# Patient Record
Sex: FEMALE | Race: WHITE | NOT HISPANIC OR LATINO | Employment: UNEMPLOYED | URBAN - METROPOLITAN AREA
[De-identification: names, ages, dates, MRNs, and addresses within clinical notes are randomized per-mention and may not be internally consistent; named-entity substitution may affect disease eponyms.]

---
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
## 2024-01-08 ENCOUNTER — OFFICE VISIT (OUTPATIENT)
Dept: RHEUMATOLOGY | Facility: CLINIC | Age: 19
End: 2024-01-08
Attending: PEDIATRICS
Payer: COMMERCIAL

## 2024-01-08 VITALS
DIASTOLIC BLOOD PRESSURE: 76 MMHG | WEIGHT: 159.61 LBS | HEART RATE: 69 BPM | OXYGEN SATURATION: 99 % | BODY MASS INDEX: 25.65 KG/M2 | SYSTOLIC BLOOD PRESSURE: 113 MMHG | TEMPERATURE: 98.5 F | HEIGHT: 66 IN

## 2024-01-08 DIAGNOSIS — M13.0 POLYARTHRITIS: ICD-10-CM

## 2024-01-08 DIAGNOSIS — I73.00 RAYNAUD'S DISEASE WITHOUT GANGRENE: ICD-10-CM

## 2024-01-08 DIAGNOSIS — M35.1 MCTD (MIXED CONNECTIVE TISSUE DISEASE) (H): ICD-10-CM

## 2024-01-08 DIAGNOSIS — M08.80 POLYARTICULAR JUVENILE IDIOPATHIC ARTHRITIS (H): ICD-10-CM

## 2024-01-08 DIAGNOSIS — D84.9 IMMUNOSUPPRESSED STATUS (H): ICD-10-CM

## 2024-01-08 DIAGNOSIS — Z79.899 LONG-TERM USE OF PLAQUENIL: ICD-10-CM

## 2024-01-08 LAB
ALBUMIN SERPL BCG-MCNC: 3.9 G/DL (ref 3.5–5.2)
ALP SERPL-CCNC: 84 U/L (ref 40–150)
ALT SERPL W P-5'-P-CCNC: 16 U/L (ref 0–50)
ANION GAP SERPL CALCULATED.3IONS-SCNC: 8 MMOL/L (ref 7–15)
AST SERPL W P-5'-P-CCNC: 25 U/L (ref 0–35)
BASOPHILS # BLD AUTO: 0 10E3/UL (ref 0–0.2)
BASOPHILS NFR BLD AUTO: 0 %
BILIRUB SERPL-MCNC: 0.2 MG/DL
BUN SERPL-MCNC: 10.6 MG/DL (ref 6–20)
CALCIUM SERPL-MCNC: 9.1 MG/DL (ref 8.6–10)
CHLORIDE SERPL-SCNC: 103 MMOL/L (ref 98–107)
CREAT SERPL-MCNC: 0.71 MG/DL (ref 0.51–0.95)
CRP SERPL-MCNC: <3 MG/L
DEPRECATED HCO3 PLAS-SCNC: 23 MMOL/L (ref 22–29)
EGFRCR SERPLBLD CKD-EPI 2021: >90 ML/MIN/1.73M2
EOSINOPHIL # BLD AUTO: 0.1 10E3/UL (ref 0–0.7)
EOSINOPHIL NFR BLD AUTO: 1 %
ERYTHROCYTE [DISTWIDTH] IN BLOOD BY AUTOMATED COUNT: 12.7 % (ref 10–15)
ERYTHROCYTE [SEDIMENTATION RATE] IN BLOOD BY WESTERGREN METHOD: 8 MM/HR (ref 0–20)
GLUCOSE SERPL-MCNC: 88 MG/DL (ref 70–99)
HCT VFR BLD AUTO: 41.4 % (ref 35–47)
HGB BLD-MCNC: 14.1 G/DL (ref 11.7–15.7)
IMM GRANULOCYTES # BLD: 0 10E3/UL
IMM GRANULOCYTES NFR BLD: 0 %
LYMPHOCYTES # BLD AUTO: 1.3 10E3/UL (ref 0.8–5.3)
LYMPHOCYTES NFR BLD AUTO: 26 %
MCH RBC QN AUTO: 29.6 PG (ref 26.5–33)
MCHC RBC AUTO-ENTMCNC: 34.1 G/DL (ref 31.5–36.5)
MCV RBC AUTO: 87 FL (ref 78–100)
MONOCYTES # BLD AUTO: 0.5 10E3/UL (ref 0–1.3)
MONOCYTES NFR BLD AUTO: 9 %
NEUTROPHILS # BLD AUTO: 3.2 10E3/UL (ref 1.6–8.3)
NEUTROPHILS NFR BLD AUTO: 64 %
NRBC # BLD AUTO: 0 10E3/UL
NRBC BLD AUTO-RTO: 0 /100
PLATELET # BLD AUTO: 225 10E3/UL (ref 150–450)
POTASSIUM SERPL-SCNC: 4.2 MMOL/L (ref 3.4–5.3)
PROT SERPL-MCNC: 7.1 G/DL (ref 6.4–8.3)
RBC # BLD AUTO: 4.77 10E6/UL (ref 3.8–5.2)
SODIUM SERPL-SCNC: 134 MMOL/L (ref 135–145)
WBC # BLD AUTO: 5.1 10E3/UL (ref 4–11)

## 2024-01-08 PROCEDURE — 86225 DNA ANTIBODY NATIVE: CPT | Performed by: PEDIATRICS

## 2024-01-08 PROCEDURE — 99213 OFFICE O/P EST LOW 20 MIN: CPT | Mod: 25 | Performed by: PEDIATRICS

## 2024-01-08 PROCEDURE — 86160 COMPLEMENT ANTIGEN: CPT | Performed by: PEDIATRICS

## 2024-01-08 PROCEDURE — 86235 NUCLEAR ANTIGEN ANTIBODY: CPT | Performed by: PEDIATRICS

## 2024-01-08 PROCEDURE — 86140 C-REACTIVE PROTEIN: CPT | Performed by: PEDIATRICS

## 2024-01-08 PROCEDURE — 90686 IIV4 VACC NO PRSV 0.5 ML IM: CPT

## 2024-01-08 PROCEDURE — 85025 COMPLETE CBC W/AUTO DIFF WBC: CPT | Performed by: PEDIATRICS

## 2024-01-08 PROCEDURE — G0008 ADMIN INFLUENZA VIRUS VAC: HCPCS

## 2024-01-08 PROCEDURE — 85652 RBC SED RATE AUTOMATED: CPT | Performed by: PEDIATRICS

## 2024-01-08 PROCEDURE — 99215 OFFICE O/P EST HI 40 MIN: CPT | Performed by: PEDIATRICS

## 2024-01-08 PROCEDURE — 86147 CARDIOLIPIN ANTIBODY EA IG: CPT | Performed by: PEDIATRICS

## 2024-01-08 PROCEDURE — 80053 COMPREHEN METABOLIC PANEL: CPT | Performed by: PEDIATRICS

## 2024-01-08 PROCEDURE — 36415 COLL VENOUS BLD VENIPUNCTURE: CPT | Performed by: PEDIATRICS

## 2024-01-08 PROCEDURE — 250N000011 HC RX IP 250 OP 636

## 2024-01-08 RX ORDER — NIFEDIPINE 30 MG/1
30 TABLET, EXTENDED RELEASE ORAL DAILY
Qty: 30 TABLET | Refills: 5 | Status: SHIPPED | OUTPATIENT
Start: 2024-01-08 | End: 2024-01-08

## 2024-01-08 RX ORDER — HYDROXYCHLOROQUINE SULFATE 200 MG/1
TABLET, FILM COATED ORAL
Qty: 135 TABLET | Refills: 3 | Status: SHIPPED | OUTPATIENT
Start: 2024-01-08 | End: 2024-04-12

## 2024-01-08 RX ORDER — ETANERCEPT 50 MG/ML
SOLUTION SUBCUTANEOUS
Qty: 4 ML | Refills: 5 | Status: SHIPPED | OUTPATIENT
Start: 2024-01-08 | End: 2024-06-12

## 2024-01-08 RX ORDER — HYDROXYCHLOROQUINE SULFATE 200 MG/1
TABLET, FILM COATED ORAL
Qty: 45 TABLET | Refills: 11 | Status: SHIPPED | OUTPATIENT
Start: 2024-01-08 | End: 2024-01-08

## 2024-01-08 RX ORDER — NIFEDIPINE 30 MG/1
30 TABLET, EXTENDED RELEASE ORAL DAILY
Qty: 90 TABLET | Refills: 1 | Status: SHIPPED | OUTPATIENT
Start: 2024-01-08 | End: 2024-04-12

## 2024-01-08 ASSESSMENT — PAIN SCALES - GENERAL: PAINLEVEL: NO PAIN (0)

## 2024-01-08 NOTE — PATIENT INSTRUCTIONS
From MCTD standpoint symptomatically doing well.  Refilled medications.  Gave eye referral to get eye exam done within next several months to screen for Plaquenil toxicity.    Plan:  Labs today and every 6 months.  Continue Enbrel, Plaquenil and nifedipine.  Need yearly eye exams screening for Plaquenil side effects, I put in a referral.  Call to make an appointment and keep it.  Flu shot today.  FOllow up late May/Early June 2024.  Call/MyChart sooner with questions or concerns.  We'll keep readdressing transition to adult rheumatology.    For Patient Education Materials:  z.[email or web address]/fo       [facility] Pediatric Rheumatology    For Help:  The Pediatric Call Center at [phone] can help with scheduling of routine follow up visits.  Letty Aviles and Donya Schwartz are the Nurse Coordinators for the Division of Pediatric Rheumatology and can be reached by phone at [phone] or through [employer] [email or web address]). They can help with questions about your child s rheumatic condition, medications, and test results.  For emergencies after hours or on the weekends, please call the page  at [phone] and ask to speak to the physician on-call for Pediatric Rheumatology. Please do not use [employer] for urgent requests.  Main  Services:  [phone]  Hmong/Azeri/Montserratian: [phone]  Wallisian: [phone]  Czech: [phone]    Internal Referrals: If we refer your child to another physician/team within [facility], you should receive a call to set this up. If you do not hear anything within a week, please call the Call Center at [phone].    External Referrals: If we refer your child to a physician/team outside of [facility], our team will send the referral order and relevant records to them. We ask that you call the place where your child is being referred to ensure they received the needed information and notify our team coordinators if  not.    Imaging: If your child needs an imaging study that is not being performed the day of your clinic appointment, please call to set this up. For xrays, ultrasounds, and echocardiogram call [phone]. For CT or MRI call [phone].     MyChart: We encourage you to sign up for MyChart at [email or web address]. For assistance or questions, call [phone]. If your child is 12 years or older, a consent for proxy/parent access needs to be signed so please discuss this with your physician at the next visit.

## 2024-01-08 NOTE — LETTER
1/8/2024      RE: Darshaan Romero  [address] [facility] [address]     Dear Colleague,    Thank you for the opportunity to participate in the care of your patient, Darshana Romero, at the [facility] at [facility]. Please see a copy of my visit note below.    No notes on file    Please do not hesitate to contact me if you have any questions/concerns.     Sincerely,       Ofelia Slade MD

## 2024-01-08 NOTE — NURSING NOTE
"FLU VACCINE QUESTIONNAIRE:  Ask the following questions of all parties who want influenza vaccination:     CONTRAINDICATIONS  1.  Is the patient age less than 6 months?  NO  2.  Has the person to be vaccinated ever had Guillain-Gibson syndrome? NO  3.  Has the person to be vaccinated had the vaccine this year? NO  4.  Is the person to be vaccinated sick today? NO  5.  Does the person to be vaccinated have an allergy to eggs or a component of the vaccine? NO  6.  Has the person to vaccinated ever had a serious reaction to an influenza vaccination in the past? NO    If the answer to ALL of the above questions is \"No\", then please administer the influenza vaccine per the standard protocol.  If the patient answered \"Yes\" to questions 1 or 2, do not administer the vaccine. If the patient answered \"Yes\" to question 3, do not administer the vaccine unless the patient is a child receiving the vaccine in two doses. If the patient answered \"Yes\" to questions 4, 5, and/or 6, get additional details on sickness and/or reaction and refer to provider. If you have any questions regarding contraindications, please refer to the provider.                                                         INFLUENZA VACCINATION NOTE      Information sheet given to patient and questions answered.     Patient or representative refused vaccination.   Reason:     ORDERS: Give influenza Vaccine   Ordered by RUCHI Swift on January 8, 2024    [ Do not give Influenza Vaccine due to contraindication or refusal ]    Candidate for Pneumovax? No    INDICATION FOR VACCINATION:  Anyone from 6 months of age or older.        Joyce Resendiz M.A.  "

## 2024-01-09 LAB
C3 SERPL-MCNC: 87 MG/DL (ref 81–157)
C4 SERPL-MCNC: 18 MG/DL (ref 13–39)

## 2024-01-10 LAB
CARDIOLIPIN IGG SER IA-ACNC: <2 GPL-U/ML
CARDIOLIPIN IGG SER IA-ACNC: NEGATIVE
CARDIOLIPIN IGM SER IA-ACNC: 7 MPL-U/ML
CARDIOLIPIN IGM SER IA-ACNC: NEGATIVE
DSDNA AB SER-ACNC: 1.2 IU/ML
ENA SM IGG SER IA-ACNC: 1.2 U/ML
ENA SM IGG SER IA-ACNC: NEGATIVE
ENA SS-A AB SER IA-ACNC: 1 U/ML
ENA SS-A AB SER IA-ACNC: NEGATIVE
ENA SS-B IGG SER IA-ACNC: 0.6 U/ML
ENA SS-B IGG SER IA-ACNC: NEGATIVE
U1 SNRNP IGG SER IA-ACNC: 106 U/ML
U1 SNRNP IGG SER IA-ACNC: POSITIVE

## 2024-01-17 NOTE — PROGRESS NOTES
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  Has Raynaud's Phenomenon and polyarthritis (RF positive.  Hands, wrists, ankles, elbow contractures, ? Hips, knees?, toes at presentation.  No erosions.)  PFTs and high res chest CT on 12/11/13.  Chest CT with mild fibrosis vs viral changes of posterior RML; PFTs normal for age.  Echo normal 2/7/2014.  Repeat chest CT 1/23/2014 new ground glass opacities, resolved RML changes.  Saw pulmonology.  Bronched.  No clear etiology.  Asymptomatic as of 8/6/2014 and again on 2//2015.  On Plaquenil, methotrexate, Enbrel, NSAID, nifedipine.  Increased Plaq and naproxen for growth 5/2016; increased enbrel for growth 9/2016 (continued joint). Off naproxen due to gastritis 1/2017.  Started methotrexate wean 6/12/2019.  ANNE disease on 10/30/2019 visit with repeat EKG, echocardiogram and PFTs.  Off methotrexate in 3/2020.  At 6/11/2020 video visit, some increased MSK symptoms but exam normal.  No change in meds.  At 9/29/2021 in person visit itch skins, bruises, made derm referral.  No change to medications. At 2/10/2022 in person visit no changes. At 1/9/2023 visit no changes to medications.  At 5/3/2023 visit no changes to medications.   At 1/8/2024 visit no changes to medications.  No changes to antibody profile on DREW; ACL resolved.                     Medications:     As of completion  "of this visit:  Current Outpatient Medications   Medication Sig Dispense Refill    buPROPion (WELLBUTRIN XL) 300 MG 24 hr tablet       escitalopram (LEXAPRO) 20 MG tablet Take 20 mg by mouth daily      etanercept (ENBREL) 50 MG/ML injection INJECT THE CONTENTS OF 1 SYRINGE SUBCUTANEOUSLY EVERY WEEK. 4 mL 5    levonorgest-eth estrad 91-Day (SEASONIQUE) 0.15-0.03 &0.01 MG tablet Take 1 tablet by mouth daily      risperiDONE (RISPERDAL) 0.5 MG tablet 0.5 mg 2 times daily      hydroxychloroquine (PLAQUENIL) 200 MG tablet Alternate 1 tablet daily with 2 tablets daily by mouth. . 135 tablet 3    NIFEdipine ER OSMOTIC (PROCARDIA XL) 30 MG 24 hr tablet Take 1 tablet (30 mg) by mouth daily 90 tablet 1             Subjective:     I saw Darshana in pediatric rheumatology clinic on 1/8/2024 in follow up for mixed connective tissue disease (MCTD) and rheumatoid factor positive juvenile idiopathic arthritis.  I last saw Darshana 8 months ago on 5/3/2023 when she was doing fairly well.  We did not make any medication changes. Darshana presented to clinic today alone.    Today Darshana wants to check in and discuss a dry itchy rash she got for the second winter in a row.    From a Raynaud phenomenon standpoint--Darshana doesn't have any issues unless she forgets her nifedipine or doesn't wear gloves/mittens when it is cold out.      She has not had any bothersome or clear episodes of cervical lymph node/submandibular lymph node swelling since last visit.    Her arthritis is \"doing great\" with 0/10 for pain, no morning stiffness, no limitations to activities.  This is while she is on weekly Enbrel.      She developed an itchiness of her proximal lower extremities/thigh area >> upper extremities and trunk.  If she scratches the affected areas she gets a bruise; but otherwise she does not have visible changes to her skin like a rash. Her skin is \"super dry.\" She is using lotion with shea butter and coconut oil 1-2x/day without much help.  Most " "affected area are her thighs.  She had a similar thing happen last winter x 2-3 months then got better over the Spring, Summer and Fall.      I reviewed her interval available EMR:  Echocardiogram 5/3/2023 normal.  PFTS 8/28/2023; still preliminary report , but no appreciable difference compared to previous on 1/2022.  10/10/2023 seen in urgent care for cough and exposure to strep and mono by boyfriend.  RST +, treated with cephalexin.  Symptoms resolved.    Otherwise last labs 5/3/2023; last DREW and ACL antibodies 1/2023.    Comprehensive Review of Systems was performed and is negative except as noted in the HPI.    From social history standpoint, about to start 2nd semester at [employer].  Had quite an adjustment to first semester and being back home over break.  Working through it with her therapist.             Examination:     Blood pressure 113/76, pulse 69, temperature 98.5  F (36.9  C), temperature source Tympanic, height 1.683 m (5' 6.26\"), weight 72.4 kg (159 lb 9.8 oz), SpO2 99%, not currently breastfeeding.  Growth charts reviewed and reassuring.  Gained back previously lost weight.  GEN:  Alert, awake and well-appearing.  HEENT:  Hair and scalp within normal limits.  Pupils equal and reactive to light.  Extraocular movements intact.  Conjunctiva clear.  External pinnae normal bilaterally. Nasal mucosa normal without lesions.  Oral mucosa moist and without lesions. No thyromegaly.  LYMPH:  No cervical or supraclavicular or inguinal lymphadenopathy.  CV:  Regular rate and rhythm.  No murmurs, rubs or gallops.  Radial , femoral and dorsalis pedal pulses full and symmetric.  RESP:  Clear to auscultation bilaterally with good aeration.   ABD:  Soft, non-tender, non-distended.  No hepatosplenomegaly or masses appreciated.  SKIN: A full skin exam is performed, except for the breast, genital and buttocks area, and is normal except for somewhat dry skin diffusely and 3 erythematous tiny papules on the right " anterior thigh with bruises near them.   Nails and nailfold capilaries are normal.  NEURO:  Awake, alert and oriented.  Face symmetric.  MUSCULOSKELETAL: Joint exam including TMJ, cervical spine, acromioclavicular, sternoclavicular, shoulders, elbows, wrists, fingers, hips, knees, ankles, toes was performed and is normal. No arthritis or enthesitis.  TMJ ID5.1. Back is flexible.  Strength is 5/5 in upper and lower extremities. Gait and run are normal.         Last Imaging Results:   PFT preliminary 8/28/2023: no change.  Echocardiogram 5/9/2023:  Normal intracardiac connections. There is normal appearance and motion of the  tricuspid, mitral, pulmonary and aortic valves. Normal ascending aorta. The  left and right ventricles have normal chamber size, wall thickness, and  systolic function. The calculated biplane left ventricular ejection fraction  is 63 %. No pericardial effusion.  No significant change from last echocardiogram.     EKG 1/9/2023 normal sinus rhythm..             Last Lab Results:   Laboratory investigations performed today are listed below.    Office Visit on 01/08/2024   Component Date Value Ref Range Status    CRP Inflammation 01/08/2024 <3.00  <5.00 mg/L Final    Erythrocyte Sedimentation Rate 01/08/2024 8  0 - 20 mm/hr Final    Sodium 01/08/2024 134 (L)  135 - 145 mmol/L Final    Potassium 01/08/2024 4.2  3.4 - 5.3 mmol/L Final    Carbon Dioxide (CO2) 01/08/2024 23  22 - 29 mmol/L Final    Anion Gap 01/08/2024 8  7 - 15 mmol/L Final    Urea Nitrogen 01/08/2024 10.6  6.0 - 20.0 mg/dL Final    Creatinine 01/08/2024 0.71  0.51 - 0.95 mg/dL Final    GFR Estimate 01/08/2024 >90  >60 mL/min/1.73m2 Final    Calcium 01/08/2024 9.1  8.6 - 10.0 mg/dL Final    Chloride 01/08/2024 103  98 - 107 mmol/L Final    Glucose 01/08/2024 88  70 - 99 mg/dL Final    Alkaline Phosphatase 01/08/2024 84  40 - 150 U/L Final    AST 01/08/2024 25  0 - 35 U/L Final    ALT 01/08/2024 16  0 - 50 U/L Final    Protein Total  01/08/2024 7.1  6.4 - 8.3 g/dL Final    Albumin 01/08/2024 3.9  3.5 - 5.2 g/dL Final    Bilirubin Total 01/08/2024 0.2  <=1.2 mg/dL Final    RNP Meron IgG Instrument Value 01/08/2024 106.0 (H)  <5.0 U/mL Final    RNP Antibody IgG 01/08/2024 Positive (A)  Negative Final    Baez DREW Meron IgG Instrument Value 01/08/2024 1.2  <7.0 U/mL Final    Baez DREW Antibody IgG 01/08/2024 Negative  Negative Final    SSA Meron IgG Instrument Value 01/08/2024 1.0  <7.0 U/mL Final    SSA (Ro) Antibody IgG 01/08/2024 Negative  Negative Final    SSB Meron IgG Instrument Value 01/08/2024 0.6  <7.0 U/mL Final    SSB (La) Antibody IgG 01/08/2024 Negative  Negative Final    DNA (ds) Antibody 01/08/2024 1.2  <10.0 IU/mL Final    C3 Complement 01/08/2024 87  81 - 157 mg/dL Final    C4 Complement 01/08/2024 18  13 - 39 mg/dL Final    Cardiolipin Meron IgG Instrument Stephanie* 01/08/2024 <2.0  <10.0 GPL-U/mL Final    Cardiolipin Antibody IgG 01/08/2024 Negative  Negative Final    Cardiolipin Meron IgM Instrument Stephanei* 01/08/2024 7.0  <10.0 MPL-U/mL Final    Cardiolipin Antibody IgM 01/08/2024 Negative  Negative Final    WBC Count 01/08/2024 5.1  4.0 - 11.0 10e3/uL Final    RBC Count 01/08/2024 4.77  3.80 - 5.20 10e6/uL Final    Hemoglobin 01/08/2024 14.1  11.7 - 15.7 g/dL Final    Hematocrit 01/08/2024 41.4  35.0 - 47.0 % Final    MCV 01/08/2024 87  78 - 100 fL Final    MCH 01/08/2024 29.6  26.5 - 33.0 pg Final    MCHC 01/08/2024 34.1  31.5 - 36.5 g/dL Final    RDW 01/08/2024 12.7  10.0 - 15.0 % Final    Platelet Count 01/08/2024 225  150 - 450 10e3/uL Final    % Neutrophils 01/08/2024 64  % Final    % Lymphocytes 01/08/2024 26  % Final    % Monocytes 01/08/2024 9  % Final    % Eosinophils 01/08/2024 1  % Final    % Basophils 01/08/2024 0  % Final    % Immature Granulocytes 01/08/2024 0  % Final    NRBCs per 100 WBC 01/08/2024 0  <1 /100 Final    Absolute Neutrophils 01/08/2024 3.2  1.6 - 8.3 10e3/uL Final    Absolute Lymphocytes 01/08/2024 1.3  0.8 - 5.3  10e3/uL Final    Absolute Monocytes 01/08/2024 0.5  0.0 - 1.3 10e3/uL Final    Absolute Eosinophils 01/08/2024 0.1  0.0 - 0.7 10e3/uL Final    Absolute Basophils 01/08/2024 0.0  0.0 - 0.2 10e3/uL Final    Absolute Immature Granulocytes 01/08/2024 0.0  <=0.4 10e3/uL Final    Absolute NRBCs 01/08/2024 0.0  10e3/uL Final     These are reassuring.  No change to DREW panel--still RNP positive.  This is not unexpected.    Anticardiolipin antibody has normalized.           Assessment:     Darshana is a 18-year-old female with:  1.  Mixed connective tissue disease (ZACK, RNP, Baez, rheumatoid factor, hypogammaglobulinemia, polyarthritis, Raynaud phenomenon at presentation) with reassuring interval history, physical exam and labs today on Plaquenil, nifedipine and Enbrel.    2.  History of very mildly elevated anticardiolipin IgM without any known associated symptoms or clot resolved on labs today.  3.  Itchy skin leading to bruising when scratches with diffusely dry skin.  No clear rash.  Most likely xerosis (dry skin).  Recommended good emollient use.  Follow up with PCP if not improved with that.  See social history tab.         Plan:     Labs today and every 6 months.  Continue Enbrel, Plaquenil and nifedipine.  Need yearly eye exams screening for Plaquenil side effects, I put in a referral. This is overdue.   Flu shot today.  Follow up late May/Early June 2024.  Call/MyChart sooner with questions or concerns.  We'll keep readdressing transition to adult rheumatology.    Thank you for continuing to involve me in Darshana's medical care.  Please do not hesitate to contact me with any questions or concerns.    Sincerely,    Ofelia Slade M.D.   of Pediatrics  Pediatric Rheumatology  Direct clinic number [phone]  Pager : [phone]    I spent a total of 41 minutes on the day of the visit.   Time spent by me doing chart review, history and exam, documentation and further activities per the  note        This note was dictated and might contain unintended typographical errors missed in proofreading.  If there are questions/concerns, please contact the author.

## 2024-04-12 DIAGNOSIS — M13.0 POLYARTHRITIS: ICD-10-CM

## 2024-04-12 DIAGNOSIS — M35.1 MCTD (MIXED CONNECTIVE TISSUE DISEASE) (H): ICD-10-CM

## 2024-04-12 RX ORDER — NIFEDIPINE 30 MG/1
30 TABLET, EXTENDED RELEASE ORAL DAILY
Qty: 90 TABLET | Refills: 1 | Status: SHIPPED | OUTPATIENT
Start: 2024-04-12

## 2024-04-12 RX ORDER — HYDROXYCHLOROQUINE SULFATE 200 MG/1
TABLET, FILM COATED ORAL
Qty: 135 TABLET | Refills: 3 | Status: SHIPPED | OUTPATIENT
Start: 2024-04-12 | End: 2024-06-12

## 2024-06-12 ENCOUNTER — OFFICE VISIT (OUTPATIENT)
Dept: RHEUMATOLOGY | Facility: CLINIC | Age: 19
End: 2024-06-12
Attending: PEDIATRICS
Payer: COMMERCIAL

## 2024-06-12 VITALS
OXYGEN SATURATION: 100 % | HEART RATE: 76 BPM | WEIGHT: 155.42 LBS | HEIGHT: 65 IN | TEMPERATURE: 97.5 F | DIASTOLIC BLOOD PRESSURE: 81 MMHG | BODY MASS INDEX: 25.9 KG/M2 | SYSTOLIC BLOOD PRESSURE: 120 MMHG

## 2024-06-12 DIAGNOSIS — M35.1 MCTD (MIXED CONNECTIVE TISSUE DISEASE) (H): ICD-10-CM

## 2024-06-12 DIAGNOSIS — M13.0 POLYARTHRITIS: ICD-10-CM

## 2024-06-12 DIAGNOSIS — D84.9 IMMUNOSUPPRESSED STATUS (H): ICD-10-CM

## 2024-06-12 DIAGNOSIS — Z79.899 LONG-TERM USE OF PLAQUENIL: ICD-10-CM

## 2024-06-12 DIAGNOSIS — I73.00 RAYNAUD'S DISEASE WITHOUT GANGRENE: ICD-10-CM

## 2024-06-12 DIAGNOSIS — M08.80 POLYARTICULAR JUVENILE IDIOPATHIC ARTHRITIS (H): ICD-10-CM

## 2024-06-12 LAB
ALBUMIN SERPL BCG-MCNC: 4.6 G/DL (ref 3.5–5.2)
ALBUMIN UR-MCNC: NEGATIVE MG/DL
ALP SERPL-CCNC: 112 U/L (ref 40–150)
ALT SERPL W P-5'-P-CCNC: 12 U/L (ref 0–50)
ANION GAP SERPL CALCULATED.3IONS-SCNC: 10 MMOL/L (ref 7–15)
APPEARANCE UR: CLEAR
AST SERPL W P-5'-P-CCNC: 21 U/L (ref 0–45)
BASOPHILS # BLD AUTO: 0 10E3/UL (ref 0–0.2)
BASOPHILS NFR BLD AUTO: 0 %
BILIRUB SERPL-MCNC: 0.6 MG/DL
BILIRUB UR QL STRIP: NEGATIVE
BUN SERPL-MCNC: 16.1 MG/DL (ref 6–20)
CALCIUM SERPL-MCNC: 9.5 MG/DL (ref 8.6–10)
CHLORIDE SERPL-SCNC: 103 MMOL/L (ref 98–107)
COLOR UR AUTO: ABNORMAL
CREAT SERPL-MCNC: 0.77 MG/DL (ref 0.51–0.95)
CRP SERPL-MCNC: <3 MG/L
DEPRECATED HCO3 PLAS-SCNC: 23 MMOL/L (ref 22–29)
EGFRCR SERPLBLD CKD-EPI 2021: >90 ML/MIN/1.73M2
EOSINOPHIL # BLD AUTO: 0.1 10E3/UL (ref 0–0.7)
EOSINOPHIL NFR BLD AUTO: 2 %
ERYTHROCYTE [DISTWIDTH] IN BLOOD BY AUTOMATED COUNT: 12.5 % (ref 10–15)
ERYTHROCYTE [SEDIMENTATION RATE] IN BLOOD BY WESTERGREN METHOD: 10 MM/HR (ref 0–20)
GLUCOSE SERPL-MCNC: 73 MG/DL (ref 70–99)
GLUCOSE UR STRIP-MCNC: NEGATIVE MG/DL
HCT VFR BLD AUTO: 38.3 % (ref 35–47)
HGB BLD-MCNC: 13.5 G/DL (ref 11.7–15.7)
HGB UR QL STRIP: NEGATIVE
IMM GRANULOCYTES # BLD: 0 10E3/UL
IMM GRANULOCYTES NFR BLD: 0 %
KETONES UR STRIP-MCNC: NEGATIVE MG/DL
LEUKOCYTE ESTERASE UR QL STRIP: ABNORMAL
LYMPHOCYTES # BLD AUTO: 1.5 10E3/UL (ref 0.8–5.3)
LYMPHOCYTES NFR BLD AUTO: 29 %
MCH RBC QN AUTO: 29.6 PG (ref 26.5–33)
MCHC RBC AUTO-ENTMCNC: 35.2 G/DL (ref 31.5–36.5)
MCV RBC AUTO: 84 FL (ref 78–100)
MONOCYTES # BLD AUTO: 0.6 10E3/UL (ref 0–1.3)
MONOCYTES NFR BLD AUTO: 11 %
NEUTROPHILS # BLD AUTO: 3.1 10E3/UL (ref 1.6–8.3)
NEUTROPHILS NFR BLD AUTO: 58 %
NITRATE UR QL: NEGATIVE
NRBC # BLD AUTO: 0 10E3/UL
NRBC BLD AUTO-RTO: 0 /100
PH UR STRIP: 6.5 [PH] (ref 5–7)
PLATELET # BLD AUTO: 219 10E3/UL (ref 150–450)
POTASSIUM SERPL-SCNC: 4.4 MMOL/L (ref 3.4–5.3)
PROT SERPL-MCNC: 7.7 G/DL (ref 6.4–8.3)
RBC # BLD AUTO: 4.56 10E6/UL (ref 3.8–5.2)
RBC URINE: <1 /HPF
SODIUM SERPL-SCNC: 136 MMOL/L (ref 135–145)
SP GR UR STRIP: 1.02 (ref 1–1.03)
SQUAMOUS EPITHELIAL: 3 /HPF
UROBILINOGEN UR STRIP-MCNC: NORMAL MG/DL
WBC # BLD AUTO: 5.3 10E3/UL (ref 4–11)
WBC URINE: 2 /HPF

## 2024-06-12 PROCEDURE — G2211 COMPLEX E/M VISIT ADD ON: HCPCS | Performed by: PEDIATRICS

## 2024-06-12 PROCEDURE — 85048 AUTOMATED LEUKOCYTE COUNT: CPT | Performed by: PEDIATRICS

## 2024-06-12 PROCEDURE — 36415 COLL VENOUS BLD VENIPUNCTURE: CPT | Performed by: PEDIATRICS

## 2024-06-12 PROCEDURE — 99214 OFFICE O/P EST MOD 30 MIN: CPT | Performed by: PEDIATRICS

## 2024-06-12 PROCEDURE — 85652 RBC SED RATE AUTOMATED: CPT | Performed by: PEDIATRICS

## 2024-06-12 PROCEDURE — 81003 URINALYSIS AUTO W/O SCOPE: CPT | Performed by: PEDIATRICS

## 2024-06-12 PROCEDURE — 99213 OFFICE O/P EST LOW 20 MIN: CPT | Performed by: PEDIATRICS

## 2024-06-12 PROCEDURE — 86160 COMPLEMENT ANTIGEN: CPT | Performed by: PEDIATRICS

## 2024-06-12 PROCEDURE — 80053 COMPREHEN METABOLIC PANEL: CPT | Performed by: PEDIATRICS

## 2024-06-12 PROCEDURE — 86140 C-REACTIVE PROTEIN: CPT | Performed by: PEDIATRICS

## 2024-06-12 RX ORDER — HYDROXYCHLOROQUINE SULFATE 200 MG/1
TABLET, FILM COATED ORAL
Qty: 135 TABLET | Refills: 3 | Status: SHIPPED | OUTPATIENT
Start: 2024-06-12

## 2024-06-12 RX ORDER — ETANERCEPT 50 MG/ML
SOLUTION SUBCUTANEOUS
Qty: 4 ML | Refills: 5 | Status: SHIPPED | OUTPATIENT
Start: 2024-06-12

## 2024-06-12 ASSESSMENT — PAIN SCALES - GENERAL: PAINLEVEL: NO PAIN (0)

## 2024-06-12 NOTE — PATIENT INSTRUCTIONS
Looking good.    Plan:  Labs today and at least 2 x/year  Continue current medications, all were refilled for at least 6 months.  Yearly eye exam screening for Plaquenil toxicity--glad you are getting this coordinated.  Follow up with me by 6 months.  Call/PDD [facility] with questions or concerns.    Ofelia Slade M.D.   of Pediatrics  Pediatric Rheumatology      For Patient Education Materials:  [email or web address]/fo       [facility] Pediatric Rheumatology    For Help:  The Pediatric Call Center at [phone] can help with scheduling of routine follow up visits.  Letty Aviles and Donya Schwartz are the Nurse Coordinators for the Division of Pediatric Rheumatology and can be reached by phone at [phone] or through [employer] ([email or web address]). They can help with questions about your child s rheumatic condition, medications, and test results.  For emergencies after hours or on the weekends, please call the page  at [phone] and ask to speak to the physician on-call for Pediatric Rheumatology. Please do not use [employer] for urgent requests.  Main  Services:  [phone]  Hmong/Comoran/Donny: [phone]  South Korean: [phone]  K[place]i: [phone]    Internal Referrals: If we refer your child to another physician/team within [facility], you should receive a call to set this up. If you do not hear anything within a week, please call the Call Center at [phone].    External Referrals: If we refer your child to a physician/team outside of [facility], our team will send the referral order and relevant records to them. We ask that you call the place where your child is being referred to ensure they received the needed information and notify our team coordinators if not.    Imaging: If your child needs an imaging study that is not being performed the day of your clinic appointment, please call to set this up. For xrays, ultrasounds, and  echocardiogram call [phone]. For CT or MRI call [phone].     MyChart: We encourage you to sign up for MyChart at [email or web address]. For assistance or questions, call [phone]. If your child is 12 years or older, a consent for proxy/parent access needs to be signed so please discuss this with your physician at the next visit.

## 2024-06-12 NOTE — LETTER
6/12/2024      RE: Darshana Romero  [address] [facility] [address]     Dear Colleague,    Thank you for the opportunity to participate in the care of your patient, Darshana Romero, at the [facility] at [facility]. Please see a copy of my visit note below.           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  Has Raynaud's Phenomenon and polyarthritis (RF positive.  Hands, wrists, ankles, elbow contractures, ? Hips, knees?, toes at presentation.  No erosions.)  PFTs and high res chest CT on 12/11/13.  Chest CT with mild fibrosis vs viral changes of posterior RML; PFTs normal for age.  Echo normal 2/7/2014.  Repeat chest CT 1/23/2014 new ground glass opacities, resolved RML changes.  Saw pulmonology.  Bronched.  No clear etiology.  Asymptomatic as of 8/6/2014 and again on 2//2015.  On Plaquenil, methotrexate, Enbrel, NSAID, nifedipine.  Increased Plaq and naproxen for growth 5/2016; increased enbrel for growth 9/2016 (continued joint). Off naproxen due to gastritis 1/2017.  Started methotrexate wean 6/12/2019.  ANNE disease on 10/30/2019 visit with repeat EKG, echocardiogram and PFTs.  Off methotrexate in 3/2020.  At 6/11/2020 video visit, some increased MSK symptoms but exam normal.  No change in meds.  At 9/29/2021 in person visit  itch skins, bruises, made derm referral.  No change to medications. At 2/10/2022 in person visit no changes. At 1/9/2023 visit no changes to medications.  At 5/3/2023 visit no changes to medications.   At 1/8/2024 visit no changes to medications.  No changes to antibody profile on DREW; ACL resolved.                     Medications:     As of completion of this visit:  Current Outpatient Medications   Medication Sig Dispense Refill    buPROPion (WELLBUTRIN XL) 300 MG 24 hr tablet       escitalopram (LEXAPRO) 20 MG tablet Take 20 mg by mouth daily      etanercept (ENBREL) 50 MG/ML injection INJECT THE CONTENTS OF 1 SYRINGE SUBCUTANEOUSLY EVERY WEEK. 4 mL 5    hydroxychloroquine (PLAQUENIL) 200 MG tablet Alternate 1 tablet daily with 2 tablets daily by mouth. . 135 tablet 3    levonorgest-eth estrad 91-Day (SEASONIQUE) 0.15-0.03 &0.01 MG tablet Take 1 tablet by mouth daily      NIFEdipine ER OSMOTIC (PROCARDIA XL) 30 MG 24 hr tablet Take 1 tablet (30 mg) by mouth daily 90 tablet 1    risperiDONE (RISPERDAL) 0.5 MG tablet 0.5 mg 2 times daily               Subjective:      I saw Darshana in pediatric rheumatology clinic on 6/12/2024 in follow-up for mixed connective tissue disease (MCTD) and rheumatoid factor positive juvenile idiopathic arthritis.  I last saw Darshana 5 months ago on 1/8/2024.  Repeat DREW panel showed no changes with just a positive RNP.  Anticardiolipin antibodies became normal or negative.  We did not make any medication changes and she continued on nifedipine, Plaquenil and Enbrel.  Today she presents to clinic alone.    Her goals for today's visit include getting medication refills and checking in.    When she was at school she was quite consistent with her medications and had minimal symptoms.    She usually has a had time adjusting her routine when transitioning to or from college to home and she again has been off many of her medications recently.  She hasn't had a significant flare of symptoms yet,  "but is stiff here and there.      She has also had more nausea recently--making it harder to eat/drink, gets fatigued.  Her LMP was 2-3 weeks ago.      She occasionally gets the lymph node swelling in her neck--doesn't only occur when inconsistent on her medications.      She has mild photosensitivity of her eyes with sun sometimes.    Comprehensive Review of Systems was performed and is negative except as noted in the HPI.           Examination:     Blood pressure 120/81, pulse 76, temperature 97.5  F (36.4  C), temperature source Tympanic, height 1.662 m (5' 5.43\"), weight 70.5 kg (155 lb 6.8 oz), SpO2 100%, not currently breastfeeding.  Has gained weight.  GEN:  Alert, awake and well-appearing.  HEENT:  Hair and scalp within normal limits.  Pupils equal and reactive to light.  Extraocular movements intact.  Conjunctiva clear.  External pinnae and tympanic membranes normal bilaterally. Nasal mucosa normal without lesions.  Oral mucosa moist and without lesions. No thyromegaly. No parotid enlargement or tenderness.  LYMPH:  No cervical or supraclavicular lymphadenopathy.   CV:  Regular rate and rhythm.  No murmurs, rubs or gallops.  Radial and dorsalis pedal pulses full and symmetric.  RESP:  Clear to auscultation bilaterally with good aeration.   ABD:  Soft, non-tender, non-distended.  No hepatosplenomegaly or masses appreciated.  SKIN: A full skin exam is performed, except for the breast, proximal extremities, genital and buttocks area, and is normal.  Nails are normal.  NEURO:  Awake, alert and oriented.  Face symmetric.  MUSCULOSKELETAL: Joint exam including TMJ, cervical spine, acromioclavicular, sternoclavicular, shoulders, elbows, wrists, fingers, hips, knees, ankles, toes was performed and is normal. No arthritis or enthesitis.  Back is flexible.  Strength is 5/5 in upper and lower extremities.           Last Imaging Results:     PFT preliminary 8/28/2023: no change.  Echocardiogram 5/9/2023:  Normal " intracardiac connections. There is normal appearance and motion of the  tricuspid, mitral, pulmonary and aortic valves. Normal ascending aorta. The  left and right ventricles have normal chamber size, wall thickness, and  systolic function. The calculated biplane left ventricular ejection fraction  is 63 %. No pericardial effusion.  No significant change from last echocardiogram.     EKG 1/9/2023 normal sinus rhythm..             Last Lab Results:   Laboratory investigations performed today are listed below.      Office Visit on 06/12/2024   Component Date Value Ref Range Status    C3 Complement 06/12/2024 96  81 - 157 mg/dL Final    C4 Complement 06/12/2024 21  13 - 39 mg/dL Final    Erythrocyte Sedimentation Rate 06/12/2024 10  0 - 20 mm/hr Final    Color Urine 06/12/2024 Light Yellow  Colorless, Straw, Light Yellow, Yellow Final    Appearance Urine 06/12/2024 Clear  Clear Final    Glucose Urine 06/12/2024 Negative  Negative mg/dL Final    Bilirubin Urine 06/12/2024 Negative  Negative Final    Ketones Urine 06/12/2024 Negative  Negative mg/dL Final    Specific Gravity Urine 06/12/2024 1.020  1.003 - 1.035 Final    Blood Urine 06/12/2024 Negative  Negative Final    pH Urine 06/12/2024 6.5  5.0 - 7.0 Final    Protein Albumin Urine 06/12/2024 Negative  Negative mg/dL Final    Urobilinogen Urine 06/12/2024 Normal  Normal, 2.0 mg/dL Final    Nitrite Urine 06/12/2024 Negative  Negative Final    Leukocyte Esterase Urine 06/12/2024 Small (A)  Negative Final    RBC Urine 06/12/2024 <1  <=2 /HPF Final    WBC Urine 06/12/2024 2  <=5 /HPF Final    Squamous Epithelials Urine 06/12/2024 3 (H)  <=1 /HPF Final    CRP Inflammation 06/12/2024 <3.00  <5.00 mg/L Final    Sodium 06/12/2024 136  135 - 145 mmol/L Final    Potassium 06/12/2024 4.4  3.4 - 5.3 mmol/L Final    Carbon Dioxide (CO2) 06/12/2024 23  22 - 29 mmol/L Final    Anion Gap 06/12/2024 10  7 - 15 mmol/L Final    Urea Nitrogen 06/12/2024 16.1  6.0 - 20.0 mg/dL Final     Creatinine 06/12/2024 0.77  0.51 - 0.95 mg/dL Final    GFR Estimate 06/12/2024 >90  >60 mL/min/1.73m2 Final    Calcium 06/12/2024 9.5  8.6 - 10.0 mg/dL Final    Chloride 06/12/2024 103  98 - 107 mmol/L Final    Glucose 06/12/2024 73  70 - 99 mg/dL Final    Alkaline Phosphatase 06/12/2024 112  40 - 150 U/L Final    AST 06/12/2024 21  0 - 45 U/L Final    ALT 06/12/2024 12  0 - 50 U/L Final    Protein Total 06/12/2024 7.7  6.4 - 8.3 g/dL Final    Albumin 06/12/2024 4.6  3.5 - 5.2 g/dL Final    Bilirubin Total 06/12/2024 0.6  <=1.2 mg/dL Final    WBC Count 06/12/2024 5.3  4.0 - 11.0 10e3/uL Final    RBC Count 06/12/2024 4.56  3.80 - 5.20 10e6/uL Final    Hemoglobin 06/12/2024 13.5  11.7 - 15.7 g/dL Final    Hematocrit 06/12/2024 38.3  35.0 - 47.0 % Final    MCV 06/12/2024 84  78 - 100 fL Final    MCH 06/12/2024 29.6  26.5 - 33.0 pg Final    MCHC 06/12/2024 35.2  31.5 - 36.5 g/dL Final    RDW 06/12/2024 12.5  10.0 - 15.0 % Final    Platelet Count 06/12/2024 219  150 - 450 10e3/uL Final    % Neutrophils 06/12/2024 58  % Final    % Lymphocytes 06/12/2024 29  % Final    % Monocytes 06/12/2024 11  % Final    % Eosinophils 06/12/2024 2  % Final    % Basophils 06/12/2024 0  % Final    % Immature Granulocytes 06/12/2024 0  % Final    NRBCs per 100 WBC 06/12/2024 0  <1 /100 Final    Absolute Neutrophils 06/12/2024 3.1  1.6 - 8.3 10e3/uL Final    Absolute Lymphocytes 06/12/2024 1.5  0.8 - 5.3 10e3/uL Final    Absolute Monocytes 06/12/2024 0.6  0.0 - 1.3 10e3/uL Final    Absolute Eosinophils 06/12/2024 0.1  0.0 - 0.7 10e3/uL Final    Absolute Basophils 06/12/2024 0.0  0.0 - 0.2 10e3/uL Final    Absolute Immature Granulocytes 06/12/2024 0.0  <=0.4 10e3/uL Final    Absolute NRBCs 06/12/2024 0.0  10e3/uL Final              Assessment:     Darshana is a 19-year-old female with:  1.  Mixed connective tissue disease (ZACK, RNP, Baez, rheumatoid factor, hypogammaglobulinemia, polyarthritis, Raynaud phenomenon at presentation) with  reassuring interval history, physical exam and labs today on Plaquenil, nifedipine and Enbrel but with reported breakthrough symptoms being inconsistent with mediations in transitioning back to home for the summer.  .    2.  History of very mildly elevated anticardiolipin IgM without any known associated symptoms or clot.  Repeat ACL negative in 1/2024.             Plan:     Labs today and at least every 6 months.  Continue current medications, all were refilled for at least 6 months.  Yearly eye exam screening for Plaquenil toxicity overdue--glad you are getting this coordinated.  Follow up with me by 6 months.  Call/MyChart with questions or concerns.      Thank you for continuing to involve me in Darshana's medical care.  Please do not hesitate to contact me with any questions or concerns.    Sincerely,    Ofelia Slade M.D.   of Pediatrics  Pediatric Rheumatology  Direct clinic number [phone]  Pager : [phone]  Ordering of each unique test  Prescription drug management   Time spent by me doing chart review, history and exam, documentation and further activities per the note        The longitudinal plan of care for the diagnosis(es)/condition(s) as documented were addressed during this visit. Due to the added complexity in care, I will continue to support Darshana in the subsequent management and with ongoing continuity of care.     This note was dictated and might contain unintended typographical errors missed in proofreading.  If there are questions/concerns, please contact the author.

## 2024-06-12 NOTE — NURSING NOTE
"Chief Complaint   Patient presents with    RECHECK       Vitals:    06/12/24 1432   BP: 120/81   BP Location: Right arm   Patient Position: Sitting   Cuff Size: Adult Regular   Pulse: 76   Temp: 97.5  F (36.4  C)   TempSrc: Tympanic   SpO2: 100%   Weight: 155 lb 6.8 oz (70.5 kg)   Height: 5' 5.43\" (166.2 cm)         Patient MyChart Active? Yes  If no, would they like to sign up? N/A    Has patient signed a Consent to Communicate form to discuss health information with guardians if applicable? Does not apply     Does patient need PHQ-2 completed today? Yes    Depression Response    Patient completed the PHQ-9 assessment for depression and scored >9? No  Question 9 on the PHQ-9 was positive for suicidality? No  Does patient have current mental health provider? No         Regina Hooks  June 12, 2024  "

## 2024-06-13 LAB
C3 SERPL-MCNC: 96 MG/DL (ref 81–157)
C4 SERPL-MCNC: 21 MG/DL (ref 13–39)

## 2024-06-19 NOTE — PROGRESS NOTES
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  Has Raynaud's Phenomenon and polyarthritis (RF positive.  Hands, wrists, ankles, elbow contractures, ? Hips, knees?, toes at presentation.  No erosions.)  PFTs and high res chest CT on 12/11/13.  Chest CT with mild fibrosis vs viral changes of posterior RML; PFTs normal for age.  Echo normal 2/7/2014.  Repeat chest CT 1/23/2014 new ground glass opacities, resolved RML changes.  Saw pulmonology.  Bronched.  No clear etiology.  Asymptomatic as of 8/6/2014 and again on 2//2015.  On Plaquenil, methotrexate, Enbrel, NSAID, nifedipine.  Increased Plaq and naproxen for growth 5/2016; increased enbrel for growth 9/2016 (continued joint). Off naproxen due to gastritis 1/2017.  Started methotrexate wean 6/12/2019.  ANNE disease on 10/30/2019 visit with repeat EKG, echocardiogram and PFTs.  Off methotrexate in 3/2020.  At 6/11/2020 video visit, some increased MSK symptoms but exam normal.  No change in meds.  At 9/29/2021 in person visit itch skins, bruises, made derm referral.  No change to medications. At 2/10/2022 in person visit no changes. At 1/9/2023 visit no changes to medications.  At 5/3/2023 visit no changes to medications.   At 1/8/2024 visit no changes to medications.  No changes to antibody profile on DREW; ACL resolved.                     Medications:     As of completion  of this visit:  Current Outpatient Medications   Medication Sig Dispense Refill    buPROPion (WELLBUTRIN XL) 300 MG 24 hr tablet       escitalopram (LEXAPRO) 20 MG tablet Take 20 mg by mouth daily      etanercept (ENBREL) 50 MG/ML injection INJECT THE CONTENTS OF 1 SYRINGE SUBCUTANEOUSLY EVERY WEEK. 4 mL 5    hydroxychloroquine (PLAQUENIL) 200 MG tablet Alternate 1 tablet daily with 2 tablets daily by mouth. . 135 tablet 3    levonorgest-eth estrad 91-Day (SEASONIQUE) 0.15-0.03 &0.01 MG tablet Take 1 tablet by mouth daily      NIFEdipine ER OSMOTIC (PROCARDIA XL) 30 MG 24 hr tablet Take 1 tablet (30 mg) by mouth daily 90 tablet 1    risperiDONE (RISPERDAL) 0.5 MG tablet 0.5 mg 2 times daily               Subjective:      I saw Darshana in pediatric rheumatology clinic on 6/12/2024 in follow-up for mixed connective tissue disease (MCTD) and rheumatoid factor positive juvenile idiopathic arthritis.  I last saw Darshana 5 months ago on 1/8/2024.  Repeat DREW panel showed no changes with just a positive RNP.  Anticardiolipin antibodies became normal or negative.  We did not make any medication changes and she continued on nifedipine, Plaquenil and Enbrel.  Today she presents to clinic alone.    Her goals for today's visit include getting medication refills and checking in.    When she was at school she was quite consistent with her medications and had minimal symptoms.    She usually has a had time adjusting her routine when transitioning to or from college to home and she again has been off many of her medications recently.  She hasn't had a significant flare of symptoms yet, but is stiff here and there.      She has also had more nausea recently--making it harder to eat/drink, gets fatigued.  Her LMP was 2-3 weeks ago.      She occasionally gets the lymph node swelling in her neck--doesn't only occur when inconsistent on her medications.      She has mild photosensitivity of her eyes with sun sometimes.    Comprehensive  "Review of Systems was performed and is negative except as noted in the HPI.           Examination:     Blood pressure 120/81, pulse 76, temperature 97.5  F (36.4  C), temperature source Tympanic, height 1.662 m (5' 5.43\"), weight 70.5 kg (155 lb 6.8 oz), SpO2 100%, not currently breastfeeding.  Has gained weight.  GEN:  Alert, awake and well-appearing.  HEENT:  Hair and scalp within normal limits.  Pupils equal and reactive to light.  Extraocular movements intact.  Conjunctiva clear.  External pinnae and tympanic membranes normal bilaterally. Nasal mucosa normal without lesions.  Oral mucosa moist and without lesions. No thyromegaly. No parotid enlargement or tenderness.  LYMPH:  No cervical or supraclavicular lymphadenopathy.   CV:  Regular rate and rhythm.  No murmurs, rubs or gallops.  Radial and dorsalis pedal pulses full and symmetric.  RESP:  Clear to auscultation bilaterally with good aeration.   ABD:  Soft, non-tender, non-distended.  No hepatosplenomegaly or masses appreciated.  SKIN: A full skin exam is performed, except for the breast, proximal extremities, genital and buttocks area, and is normal.  Nails are normal.  NEURO:  Awake, alert and oriented.  Face symmetric.  MUSCULOSKELETAL: Joint exam including TMJ, cervical spine, acromioclavicular, sternoclavicular, shoulders, elbows, wrists, fingers, hips, knees, ankles, toes was performed and is normal. No arthritis or enthesitis.  Back is flexible.  Strength is 5/5 in upper and lower extremities.           Last Imaging Results:     PFT preliminary 8/28/2023: no change.  Echocardiogram 5/9/2023:  Normal intracardiac connections. There is normal appearance and motion of the  tricuspid, mitral, pulmonary and aortic valves. Normal ascending aorta. The  left and right ventricles have normal chamber size, wall thickness, and  systolic function. The calculated biplane left ventricular ejection fraction  is 63 %. No pericardial effusion.  No significant change " from last echocardiogram.     EKG 1/9/2023 normal sinus rhythm..             Last Lab Results:   Laboratory investigations performed today are listed below.      Office Visit on 06/12/2024   Component Date Value Ref Range Status    C3 Complement 06/12/2024 96  81 - 157 mg/dL Final    C4 Complement 06/12/2024 21  13 - 39 mg/dL Final    Erythrocyte Sedimentation Rate 06/12/2024 10  0 - 20 mm/hr Final    Color Urine 06/12/2024 Light Yellow  Colorless, Straw, Light Yellow, Yellow Final    Appearance Urine 06/12/2024 Clear  Clear Final    Glucose Urine 06/12/2024 Negative  Negative mg/dL Final    Bilirubin Urine 06/12/2024 Negative  Negative Final    Ketones Urine 06/12/2024 Negative  Negative mg/dL Final    Specific Gravity Urine 06/12/2024 1.020  1.003 - 1.035 Final    Blood Urine 06/12/2024 Negative  Negative Final    pH Urine 06/12/2024 6.5  5.0 - 7.0 Final    Protein Albumin Urine 06/12/2024 Negative  Negative mg/dL Final    Urobilinogen Urine 06/12/2024 Normal  Normal, 2.0 mg/dL Final    Nitrite Urine 06/12/2024 Negative  Negative Final    Leukocyte Esterase Urine 06/12/2024 Small (A)  Negative Final    RBC Urine 06/12/2024 <1  <=2 /HPF Final    WBC Urine 06/12/2024 2  <=5 /HPF Final    Squamous Epithelials Urine 06/12/2024 3 (H)  <=1 /HPF Final    CRP Inflammation 06/12/2024 <3.00  <5.00 mg/L Final    Sodium 06/12/2024 136  135 - 145 mmol/L Final    Potassium 06/12/2024 4.4  3.4 - 5.3 mmol/L Final    Carbon Dioxide (CO2) 06/12/2024 23  22 - 29 mmol/L Final    Anion Gap 06/12/2024 10  7 - 15 mmol/L Final    Urea Nitrogen 06/12/2024 16.1  6.0 - 20.0 mg/dL Final    Creatinine 06/12/2024 0.77  0.51 - 0.95 mg/dL Final    GFR Estimate 06/12/2024 >90  >60 mL/min/1.73m2 Final    Calcium 06/12/2024 9.5  8.6 - 10.0 mg/dL Final    Chloride 06/12/2024 103  98 - 107 mmol/L Final    Glucose 06/12/2024 73  70 - 99 mg/dL Final    Alkaline Phosphatase 06/12/2024 112  40 - 150 U/L Final    AST 06/12/2024 21  0 - 45 U/L Final    ALT  06/12/2024 12  0 - 50 U/L Final    Protein Total 06/12/2024 7.7  6.4 - 8.3 g/dL Final    Albumin 06/12/2024 4.6  3.5 - 5.2 g/dL Final    Bilirubin Total 06/12/2024 0.6  <=1.2 mg/dL Final    WBC Count 06/12/2024 5.3  4.0 - 11.0 10e3/uL Final    RBC Count 06/12/2024 4.56  3.80 - 5.20 10e6/uL Final    Hemoglobin 06/12/2024 13.5  11.7 - 15.7 g/dL Final    Hematocrit 06/12/2024 38.3  35.0 - 47.0 % Final    MCV 06/12/2024 84  78 - 100 fL Final    MCH 06/12/2024 29.6  26.5 - 33.0 pg Final    MCHC 06/12/2024 35.2  31.5 - 36.5 g/dL Final    RDW 06/12/2024 12.5  10.0 - 15.0 % Final    Platelet Count 06/12/2024 219  150 - 450 10e3/uL Final    % Neutrophils 06/12/2024 58  % Final    % Lymphocytes 06/12/2024 29  % Final    % Monocytes 06/12/2024 11  % Final    % Eosinophils 06/12/2024 2  % Final    % Basophils 06/12/2024 0  % Final    % Immature Granulocytes 06/12/2024 0  % Final    NRBCs per 100 WBC 06/12/2024 0  <1 /100 Final    Absolute Neutrophils 06/12/2024 3.1  1.6 - 8.3 10e3/uL Final    Absolute Lymphocytes 06/12/2024 1.5  0.8 - 5.3 10e3/uL Final    Absolute Monocytes 06/12/2024 0.6  0.0 - 1.3 10e3/uL Final    Absolute Eosinophils 06/12/2024 0.1  0.0 - 0.7 10e3/uL Final    Absolute Basophils 06/12/2024 0.0  0.0 - 0.2 10e3/uL Final    Absolute Immature Granulocytes 06/12/2024 0.0  <=0.4 10e3/uL Final    Absolute NRBCs 06/12/2024 0.0  10e3/uL Final              Assessment:     Darshana is a 19-year-old female with:  1.  Mixed connective tissue disease (ZACK, RNP, Baez, rheumatoid factor, hypogammaglobulinemia, polyarthritis, Raynaud phenomenon at presentation) with reassuring interval history, physical exam and labs today on Plaquenil, nifedipine and Enbrel but with reported breakthrough symptoms being inconsistent with mediations in transitioning back to home for the summer.  .    2.  History of very mildly elevated anticardiolipin IgM without any known associated symptoms or clot.  Repeat ACL negative in 1/2024.              Plan:     Labs today and at least every 6 months.  Continue current medications, all were refilled for at least 6 months.  Yearly eye exam screening for Plaquenil toxicity overdue--glad you are getting this coordinated.  Follow up with me by 6 months.  Call/MyChart with questions or concerns.      Thank you for continuing to involve me in Darshana's medical care.  Please do not hesitate to contact me with any questions or concerns.    Sincerely,    Ofelia Slade M.D.   of Pediatrics  Pediatric Rheumatology  Direct clinic number [phone]  Pager : [phone]  Ordering of each unique test  Prescription drug management   Time spent by me doing chart review, history and exam, documentation and further activities per the note        The longitudinal plan of care for the diagnosis(es)/condition(s) as documented were addressed during this visit. Due to the added complexity in care, I will continue to support Darshana in the subsequent management and with ongoing continuity of care.     This note was dictated and might contain unintended typographical errors missed in proofreading.  If there are questions/concerns, please contact the author.

## 2024-08-19 NOTE — LETTER
Problem: Skin Integrity Alteration  Goal: Skin remains intact with no new/deterioration of wound or pressure injury  Outcome: Monitoring/Evaluating progress  Goal: Participates in wound care activities  Outcome: Monitoring/Evaluating progress     Wound Care Provider Visit     Patient seen in Wound Care Clinic by: Arjun JIMENEZ RN assisted by: Pamela MARTINEZ    Wound care orders and/or updates: Continue NPWT - vac settings changed Intermitted 5:2 at -150. Oasis graft applied for today.     Lab(s)/additional orders placed this visit: None  Wound(s) debrided by provider: No Consent obtained/verified: N/A  Dermal Applied: No Next Dermal Placement on: n/a    Wound care being completed by: Home Care  Supplies provided/ordered: N/A    Patient education complete: Yes  Patient verbalized understanding of education/patient questions answered: Yes    Follow up in 2-3 weeks.     October 23, 2017      Darshana Ye  [address] [address]  2005      To Whom It May Concern:    This patient missed school 10/23/17 due to a clinic visit.     Please contact me at [phone] or our Pediatric Rheumatology nurses at [phone] for any questions or concerns.    Sincerely,      Ofelia Slade MD

## 2024-08-23 ENCOUNTER — TELEPHONE (OUTPATIENT)
Dept: RHEUMATOLOGY | Facility: CLINIC | Age: 19
End: 2024-08-23
Payer: COMMERCIAL

## 2024-08-23 DIAGNOSIS — M08.09 POLYARTICULAR RF POSITIVE JIA (JUVENILE IDIOPATHIC ARTHRITIS) (H): Primary | ICD-10-CM

## 2024-08-26 NOTE — TELEPHONE ENCOUNTER
PA Initiation-[employer] PA was due for renewal, but that coverage does not seem to be active any longer. Found different insurance through [employer].    Medication: ETANERCEPT 50 MG/ML SC PREFILLED SYRINGE  Insurance Company: [employer] - Phone [phone] Fax [phone]  Pharmacy Filling the Rx:    Filling Pharmacy Phone:    Filling Pharmacy Fax:    Start Date: 8/26/2024

## 2024-08-30 NOTE — TELEPHONE ENCOUNTER
Received message back from [employer] that my PA request was cancelled due to lack of identifying information.  I called [employer] and the insurance has Darshana's  as 2005. Was able to change the  o what the insurance has in order to get the PA to go through.

## 2024-09-05 DIAGNOSIS — D84.9 IMMUNOSUPPRESSED STATUS (H): ICD-10-CM

## 2024-09-05 DIAGNOSIS — M35.1 MCTD (MIXED CONNECTIVE TISSUE DISEASE) (H): ICD-10-CM

## 2024-09-05 DIAGNOSIS — I73.00 RAYNAUD'S DISEASE WITHOUT GANGRENE: ICD-10-CM

## 2024-09-05 DIAGNOSIS — Z79.899 LONG-TERM USE OF PLAQUENIL: ICD-10-CM

## 2024-09-05 DIAGNOSIS — M08.80 POLYARTICULAR JUVENILE IDIOPATHIC ARTHRITIS (H): ICD-10-CM

## 2024-09-05 RX ORDER — ETANERCEPT 50 MG/ML
SOLUTION SUBCUTANEOUS
Qty: 4 ML | Refills: 1 | OUTPATIENT
Start: 2024-09-05

## 2024-09-05 NOTE — TELEPHONE ENCOUNTER
Prior Authorization Approval    Medication: ETANERCEPT 50 MG/ML SC PREFILLED SYRINGE  Authorization Effective Date: 7/31/2024  Authorization Expiration Date: 8/30/2025  Approved Dose/Quantity:   Reference #: Key: [id number]   Insurance Company: [employer] - Phone [phone] Fax [phone]  Expected CoPay: $ 7,194.58  CoPay Card Available: Yes    Financial Assistance Needed:   [facility] - [place], [place] - [facility] [address]   Pharmacy Notified: Yes, emailed  Patient Notified: Yes, sent My Chart msg, also left msg as will need co-pay card    [place]

## 2024-11-23 ENCOUNTER — HEALTH MAINTENANCE LETTER (OUTPATIENT)
Age: 19
End: 2024-11-23

## 2025-01-08 ENCOUNTER — OFFICE VISIT (OUTPATIENT)
Dept: RHEUMATOLOGY | Facility: CLINIC | Age: 20
End: 2025-01-08
Attending: PEDIATRICS
Payer: COMMERCIAL

## 2025-01-08 VITALS
WEIGHT: 158.07 LBS | TEMPERATURE: 97.9 F | DIASTOLIC BLOOD PRESSURE: 82 MMHG | OXYGEN SATURATION: 97 % | HEIGHT: 66 IN | BODY MASS INDEX: 25.4 KG/M2 | SYSTOLIC BLOOD PRESSURE: 128 MMHG | HEART RATE: 105 BPM

## 2025-01-08 DIAGNOSIS — M35.1 MCTD (MIXED CONNECTIVE TISSUE DISEASE): ICD-10-CM

## 2025-01-08 DIAGNOSIS — I73.00 RAYNAUD'S DISEASE WITHOUT GANGRENE: ICD-10-CM

## 2025-01-08 DIAGNOSIS — M08.09 POLYARTICULAR RF POSITIVE JIA (JUVENILE IDIOPATHIC ARTHRITIS) (H): ICD-10-CM

## 2025-01-08 DIAGNOSIS — R51.9 CHRONIC NONINTRACTABLE HEADACHE, UNSPECIFIED HEADACHE TYPE: ICD-10-CM

## 2025-01-08 DIAGNOSIS — D84.9 IMMUNOSUPPRESSED STATUS: Primary | ICD-10-CM

## 2025-01-08 DIAGNOSIS — M13.0 POLYARTHRITIS: ICD-10-CM

## 2025-01-08 DIAGNOSIS — G89.29 CHRONIC NONINTRACTABLE HEADACHE, UNSPECIFIED HEADACHE TYPE: ICD-10-CM

## 2025-01-08 DIAGNOSIS — Z79.899 LONG-TERM USE OF PLAQUENIL: ICD-10-CM

## 2025-01-08 DIAGNOSIS — M08.80 POLYARTICULAR JUVENILE IDIOPATHIC ARTHRITIS (H): ICD-10-CM

## 2025-01-08 PROBLEM — T74.92XA: Status: ACTIVE | Noted: 2021-09-09

## 2025-01-08 PROBLEM — F43.29 ADJUSTMENT DISORDER WITH MIXED EMOTIONAL FEATURES: Status: ACTIVE | Noted: 2021-09-09

## 2025-01-08 LAB
ALBUMIN SERPL BCG-MCNC: 4.5 G/DL (ref 3.5–5.2)
ALBUMIN UR-MCNC: NEGATIVE MG/DL
ALP SERPL-CCNC: 116 U/L (ref 40–150)
ALT SERPL W P-5'-P-CCNC: 12 U/L (ref 0–50)
ANION GAP SERPL CALCULATED.3IONS-SCNC: 14 MMOL/L (ref 7–15)
APPEARANCE UR: ABNORMAL
AST SERPL W P-5'-P-CCNC: 21 U/L (ref 0–45)
BASOPHILS # BLD AUTO: 0 10E3/UL (ref 0–0.2)
BASOPHILS NFR BLD AUTO: 0 %
BILIRUB SERPL-MCNC: 0.5 MG/DL
BILIRUB UR QL STRIP: NEGATIVE
BUN SERPL-MCNC: 9.6 MG/DL (ref 6–20)
CALCIUM SERPL-MCNC: 9.7 MG/DL (ref 8.8–10.4)
CHLORIDE SERPL-SCNC: 103 MMOL/L (ref 98–107)
COLOR UR AUTO: YELLOW
CREAT SERPL-MCNC: 0.76 MG/DL (ref 0.51–0.95)
CRP SERPL-MCNC: 3.5 MG/L
EGFRCR SERPLBLD CKD-EPI 2021: >90 ML/MIN/1.73M2
EOSINOPHIL # BLD AUTO: 0 10E3/UL (ref 0–0.7)
EOSINOPHIL NFR BLD AUTO: 1 %
ERYTHROCYTE [DISTWIDTH] IN BLOOD BY AUTOMATED COUNT: 12.2 % (ref 10–15)
ERYTHROCYTE [SEDIMENTATION RATE] IN BLOOD BY WESTERGREN METHOD: 11 MM/HR (ref 0–20)
GLUCOSE SERPL-MCNC: 86 MG/DL (ref 70–99)
GLUCOSE UR STRIP-MCNC: NEGATIVE MG/DL
HCO3 SERPL-SCNC: 22 MMOL/L (ref 22–29)
HCT VFR BLD AUTO: 41.6 % (ref 35–47)
HGB BLD-MCNC: 14.5 G/DL (ref 11.7–15.7)
HGB UR QL STRIP: NEGATIVE
IMM GRANULOCYTES # BLD: 0 10E3/UL
IMM GRANULOCYTES NFR BLD: 0 %
KETONES UR STRIP-MCNC: NEGATIVE MG/DL
LEUKOCYTE ESTERASE UR QL STRIP: ABNORMAL
LYMPHOCYTES # BLD AUTO: 0.9 10E3/UL (ref 0.8–5.3)
LYMPHOCYTES NFR BLD AUTO: 15 %
MCH RBC QN AUTO: 29.1 PG (ref 26.5–33)
MCHC RBC AUTO-ENTMCNC: 34.9 G/DL (ref 31.5–36.5)
MCV RBC AUTO: 83 FL (ref 78–100)
MONOCYTES # BLD AUTO: 0.8 10E3/UL (ref 0–1.3)
MONOCYTES NFR BLD AUTO: 13 %
MUCOUS THREADS #/AREA URNS LPF: PRESENT /LPF
NEUTROPHILS # BLD AUTO: 4.3 10E3/UL (ref 1.6–8.3)
NEUTROPHILS NFR BLD AUTO: 70 %
NITRATE UR QL: NEGATIVE
NRBC # BLD AUTO: 0 10E3/UL
NRBC BLD AUTO-RTO: 0 /100
PH UR STRIP: 6 [PH] (ref 5–7)
PLATELET # BLD AUTO: 207 10E3/UL (ref 150–450)
POTASSIUM SERPL-SCNC: 4.1 MMOL/L (ref 3.4–5.3)
PROT SERPL-MCNC: 8.1 G/DL (ref 6.4–8.3)
RBC # BLD AUTO: 4.99 10E6/UL (ref 3.8–5.2)
RBC URINE: <1 /HPF
SODIUM SERPL-SCNC: 139 MMOL/L (ref 135–145)
SP GR UR STRIP: 1.02 (ref 1–1.03)
SQUAMOUS EPITHELIAL: 9 /HPF
UROBILINOGEN UR STRIP-MCNC: NORMAL MG/DL
WBC # BLD AUTO: 6 10E3/UL (ref 4–11)
WBC URINE: 3 /HPF

## 2025-01-08 PROCEDURE — 84460 ALANINE AMINO (ALT) (SGPT): CPT | Performed by: PEDIATRICS

## 2025-01-08 PROCEDURE — 81001 URINALYSIS AUTO W/SCOPE: CPT | Performed by: PEDIATRICS

## 2025-01-08 PROCEDURE — 84155 ASSAY OF PROTEIN SERUM: CPT | Performed by: PEDIATRICS

## 2025-01-08 PROCEDURE — 86160 COMPLEMENT ANTIGEN: CPT | Performed by: PEDIATRICS

## 2025-01-08 PROCEDURE — 86140 C-REACTIVE PROTEIN: CPT | Performed by: PEDIATRICS

## 2025-01-08 PROCEDURE — 85014 HEMATOCRIT: CPT | Performed by: PEDIATRICS

## 2025-01-08 PROCEDURE — 85004 AUTOMATED DIFF WBC COUNT: CPT | Performed by: PEDIATRICS

## 2025-01-08 PROCEDURE — 36415 COLL VENOUS BLD VENIPUNCTURE: CPT | Performed by: PEDIATRICS

## 2025-01-08 PROCEDURE — 85652 RBC SED RATE AUTOMATED: CPT | Performed by: PEDIATRICS

## 2025-01-08 RX ORDER — NIFEDIPINE 30 MG/1
30 TABLET, EXTENDED RELEASE ORAL DAILY
Qty: 90 TABLET | Refills: 1 | Status: SHIPPED | OUTPATIENT
Start: 2025-01-08

## 2025-01-08 RX ORDER — ETANERCEPT 50 MG/ML
SOLUTION SUBCUTANEOUS
Qty: 4 ML | Refills: 5 | OUTPATIENT
Start: 2025-01-08 | End: 2025-01-08

## 2025-01-08 ASSESSMENT — PAIN SCALES - GENERAL: PAINLEVEL_OUTOF10: NO PAIN (0)

## 2025-01-08 NOTE — NURSING NOTE
"Chief Complaint   Patient presents with    RECHECK     Follow-up; Ongoing headache on the back of head       Vitals:    01/08/25 1610   BP: 128/82   BP Location: Right arm   Patient Position: Sitting   Cuff Size: Adult Regular   Pulse: 105   Temp: 97.9  F (36.6  C)   TempSrc: Oral   SpO2: 97%   Weight: 158 lb 1.1 oz (71.7 kg)   Height: 5' 5.79\" (167.1 cm)     Patient MyChart Active? Yes     Kylie Garcia  January 8, 2025  " room air

## 2025-01-08 NOTE — PATIENT INSTRUCTIONS
Looks and sounds good except for headaches located at C1-occipital area and flares with positions, brief, intense, couple times a day.  Need to do MRI of brain/c-spine to investigate.    Plan:  Labs today and every 3-6 months.  MRI of brain/cervical spine without IV contrast.  Call to schedule [phone]  Eye exam screening for side effects from Plaquenil use overdue.  I made an eye referral, they will call you to schedule.  If you don't hear from them, call [phone] to schedule.  Continue medications, refills provided.  Flu shot today.  Follow up with me in 4-5 months.    For Patient Education Materials:  [email or web address]/fo       [facility] Pediatric [facility]umat[facility]    For Help:  The Pediatric Call Center at [phone] can help with scheduling of routine follow up visits.  Letty Aviles and Donya Schwartz are the Nurse Coordinators for the Division of Pediatric Rheumatology and can be reached by phone at [phone] or through [employer] ([email or web address]). They can help with questions about your child s rheumatic condition, medications, and test results.  For emergencies after hours or on the weekends, please call the page  at [phone] and ask to speak to the physician on-call for Pediatric Rheumatology. Please do not use [employer] for urgent requests.  Main  Services:  [phone]  Hmong/Josias/Chinese: [phone]  Qatari: [phone]  English: [phone]    Internal Referrals: If we refer your child to another physician/team within [facility], you should receive a call to set this up. If you do not hear anything within a week, please call the Call Center at [phone].    External Referrals: If we refer your child to a physician/team outside of [facility], our team will send the referral order and relevant records to them. We ask that you call the place where your child is being referred to ensure they received the needed information and  notify our team coordinators if not.    Imaging: If your child needs an imaging study that is not being performed the day of your clinic appointment, please call to set this up. For xrays, ultrasounds, and echocardiogram call [phone]. For CT or MRI call [phone].     MyChart: We encourage you to sign up for MyChart at [email or web address]. For assistance or questions, call [phone]. If your child is 12 years or older, a consent for proxy/parent access needs to be signed so please discuss this with your physician at the next visit.

## 2025-01-08 NOTE — NURSING NOTE
"FLU VACCINE QUESTIONNAIRE:  Ask the following questions of all parties who want influenza vaccination:     CONTRAINDICATIONS  1.  Is the patient age less than 6 months?  NO  2.  Has the person to be vaccinated ever had Guillain-[place] syndrome? NO  3.  Has the person to be vaccinated had the vaccine this year? NO  4.  Is the person to be vaccinated sick today? NO  5.  Does the person to be vaccinated have an allergy to eggs or a component of the vaccine? NO  6.  Has the person to vaccinated ever had a serious reaction to an influenza vaccination in the past? NO    If the answer to ALL of the above questions is \"No\", then please administer the influenza vaccine per the standard protocol.  If the patient answered \"Yes\" to questions 1 or 2, do not administer the vaccine. If the patient answered \"Yes\" to question 3, do not administer the vaccine unless the patient is a child receiving the vaccine in two doses. If the patient answered \"Yes\" to questions 4, 5, and/or 6, get additional details on sickness and/or reaction and refer to provider. If you have any questions regarding contraindications, please refer to the provider.                                                         INFLUENZA VACCINATION NOTE      Information sheet given to patient and questions answered.     Patient or representative refused vaccination.   Reason:     ORDERS: Give influenza Vaccine   Ordered by Dr. Slade on January 8, 2025    [ Do not give Influenza Vaccine due to contraindication or refusal ]    Candidate for Pneumovax? No    INDICATION FOR VACCINATION:  Anyone from 6 months of age or older.        Kylie Garcia, Clinic Assistant    "

## 2025-01-09 LAB
C3 SERPL-MCNC: 95 MG/DL (ref 81–157)
C4 SERPL-MCNC: 24 MG/DL (ref 13–39)

## 2025-02-01 ENCOUNTER — HOSPITAL ENCOUNTER (OUTPATIENT)
Dept: MRI IMAGING | Facility: HOSPITAL | Age: 20
Discharge: HOME | End: 2025-02-01
Attending: PEDIATRICS | Admitting: PEDIATRICS
Payer: COMMERCIAL

## 2025-02-01 DIAGNOSIS — G89.29 CHRONIC NONINTRACTABLE HEADACHE, UNSPECIFIED HEADACHE TYPE: ICD-10-CM

## 2025-02-01 DIAGNOSIS — I73.00 RAYNAUD'S DISEASE WITHOUT GANGRENE: ICD-10-CM

## 2025-02-01 DIAGNOSIS — R51.9 CHRONIC NONINTRACTABLE HEADACHE, UNSPECIFIED HEADACHE TYPE: ICD-10-CM

## 2025-02-01 DIAGNOSIS — D84.9 IMMUNOSUPPRESSED STATUS: ICD-10-CM

## 2025-02-01 DIAGNOSIS — M08.09 POLYARTICULAR RF POSITIVE JIA (JUVENILE IDIOPATHIC ARTHRITIS) (H): ICD-10-CM

## 2025-02-01 DIAGNOSIS — Z79.899 LONG-TERM USE OF PLAQUENIL: ICD-10-CM

## 2025-02-01 DIAGNOSIS — M35.1 MCTD (MIXED CONNECTIVE TISSUE DISEASE): ICD-10-CM

## 2025-02-01 PROCEDURE — 70551 MRI BRAIN STEM W/O DYE: CPT

## 2025-02-01 PROCEDURE — 72141 MRI NECK SPINE W/O DYE: CPT

## 2025-02-03 DIAGNOSIS — M35.1 MCTD (MIXED CONNECTIVE TISSUE DISEASE): ICD-10-CM

## 2025-02-03 DIAGNOSIS — G93.5 CHIARI I MALFORMATION (H): Primary | ICD-10-CM

## 2025-02-18 ENCOUNTER — OFFICE VISIT (OUTPATIENT)
Dept: NEUROSURGERY | Facility: CLINIC | Age: 20
End: 2025-02-18
Attending: PEDIATRICS
Payer: COMMERCIAL

## 2025-02-18 VITALS
RESPIRATION RATE: 16 BRPM | BODY MASS INDEX: 25.39 KG/M2 | SYSTOLIC BLOOD PRESSURE: 135 MMHG | HEIGHT: 67 IN | DIASTOLIC BLOOD PRESSURE: 85 MMHG | OXYGEN SATURATION: 100 % | HEART RATE: 61 BPM | WEIGHT: 161.8 LBS

## 2025-02-18 DIAGNOSIS — G93.5 CHIARI I MALFORMATION (H): ICD-10-CM

## 2025-02-18 DIAGNOSIS — M35.1 MCTD (MIXED CONNECTIVE TISSUE DISEASE): ICD-10-CM

## 2025-02-18 PROCEDURE — 3079F DIAST BP 80-89 MM HG: CPT | Performed by: NEUROLOGICAL SURGERY

## 2025-02-18 PROCEDURE — 99203 OFFICE O/P NEW LOW 30 MIN: CPT | Mod: GC | Performed by: NEUROLOGICAL SURGERY

## 2025-02-18 PROCEDURE — 3075F SYST BP GE 130 - 139MM HG: CPT | Performed by: NEUROLOGICAL SURGERY

## 2025-02-18 PROCEDURE — 1126F AMNT PAIN NOTED NONE PRSNT: CPT | Performed by: NEUROLOGICAL SURGERY

## 2025-02-18 ASSESSMENT — PAIN SCALES - GENERAL: PAINLEVEL_OUTOF10: NO PAIN (0)

## 2025-02-18 NOTE — PATIENT INSTRUCTIONS
MRI Cine Study Order entered  Follow up with Dr Dunn after MR Cine study    Referral to Neurology for Headache clinic.    Call Lilinaa FLORES  Neurosurgery Care Coordinator with questions/concerns     Thank you for using [facility]

## 2025-02-18 NOTE — NURSING NOTE
"Chief Complaint   Patient presents with    New Patient       /85 (BP Location: Right arm, Patient Position: Sitting, Cuff Size: Adult Regular)   Pulse 61   Resp 16   Ht 1.702 m (5' 7\")   Wt 73.4 kg (161 lb 12.8 oz)   SpO2 100%   BMI 25.34 kg/m        Lindsay Osborne    "

## 2025-02-18 NOTE — PROGRESS NOTES
"2/18/2025  Neurosurgery Clinic Visit    Chief Complaint: Chiari 1    History of present illness:  Darshana Romero is a 19 year old y/o female presenting with a PMHx of Mixed connective tissue disease (AZCK, RNP, Baez, rheumatoid factor, hypogammaglobulinemia, polyarthritis, Raynaud phenomenon), on long term plaquenil, mildly elevated anticardiolipin IgM, and PTSD, presenting today for evaluation of Chiari 1 noted on MRI after workup for headache by her rheumatologist. Patient's headaches have been most significant in the past year, however on chart review she has had difficulty with significant headaches requiring a doctor's appt since 2013. Today she is accompanied by her father.    Patient reports over the last year she's started noticing pain with upward gaze, and gets short duration occipital headaches triggered by manipulating neck into certain positions (especially looking up). She reports that she feels her eyes \"bouncing\" often and she's not clear what happened. She also reports photophobia.    Her father reports that she's had intermittent finger numbness (not when having a Raynaud's episode), foot and leg numbness, and difficulty with gait for years, worsening over the last few years. She reports significant fatigue and poor quality sleep. She endorses significant snoring. She denies being told she has apneic episodes while sleeping.    Darshana is a student at [employer].     Physical exam:   There were no vitals taken for this visit.  General: Awake and alert and in no acute distress.  Pulm: Breathing comfortably on room air  CN: Symmetric browlift, smile, tongue protrusion, palate elevation, and sternocleidomastoids. No dysarthria. Extraocular muscles are all intact. Pupils react bilaterally and equally. Right beating nystagmus on left end gaze. Hearing intact to conversation, however patient reports noticing a clear difference between the perceived pitch in either ear.  Coordination: " "Intact finger-nose-finger bilaterally. Symmetric rapid alternating movements in bilateral upper extremities   Motor: Good muscle bulk throughout. 5 out of 5 strength in bilateral upper and lower extremities   Sensation: Sensation grossly intact to light touch in all extremities.   Gait: Intact tandem gait.   Reflexes: 1+ reflexes bilateral biceps, brachioradialis. 3+ patellar tendons. Valdes's reflex negative. Bilateral clonus negative.     Imaging:  MRI brain and C-spine 2/1/2025:  The cerebellar tonsils and up to 9 mm below the level of the foramen magnum   and demonstrate abnormal peg-shaped morphology, consistent with a Chiari I malformation. Normal position of the obex and brainstem. Normal osseous morphology of the skull base. Nonspecific reversal the normal cervical lordotic curvature centered at C5.             Assessment:  # Chiari 1 malformation  On discussion with staff, low suspicion for symptoms to be secondary to Chiari 1 malformation, however would be reasonable to evaluate with further imaging. Should additionally be provided resources for management of chronic headaches.    Plan:  - MRI CINE, follow up appointment on telehealth  - Referral to headache neurology    Patient seen and discussed with Dr. Shaun MD.  Approximately 45 minutes were spent in chart and image review, evaluation of the patient and assessment of next steps.    Jackie Anthony MD on 2/18/2025 at 12:01 PM  PGY-1, Department of Neurosurgery  [facility]                REVIEWED ASSOCIATED CLINICAL DATA AND HISTORY FOUND IN THE MEDICAL RECORD:  Past Medical History:   Past Medical History:   Diagnosis Date    Arthritis     Chronic superficial gastritis without bleeding 10/06/2017    Likely due to NSAID use    Fracture, humerus     left, Summer of 2012    Lymphadenitis     per parent, recurrent, R \"parotid,\" has been treated with antibiotics in past.    MCTD (mixed connective tissue disease) 05/17/2013 "    +RNP, slightly +Baez, o/w neg DREW, negative dsDNA, normal complements, negative antiphopholipid antibodies (last checked 9/2012). Has Raynaud's Phenomenon and polyarthritis (wrists, fingers).        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  Improved after decreasing methotrexate dose.       Surgical History:   Past Surgical History:   Procedure Laterality Date    NO HISTORY OF SURGERY         Social history:   Social History     Tobacco Use    Smoking status: Never     Passive exposure: Yes    Smokeless tobacco: Never    Tobacco comments:     Mom smokes outside   Substance Use Topics    Alcohol use: No    Drug use: Yes     Types: Marijuana     Comment: occasionally; denies other drug/alcohol use.       Family history:   Family History   Problem Relation Age of Onset    Neurologic Disorder Brother         Autism    Other - See Comments Paternal Grandmother         Grave's disease, renal failure    Lupus Other         Multiple family members on paternal side       Medications:  Current Outpatient Medications   Medication Sig Dispense Refill    buPROPion (WELLBUTRIN XL) 300 MG 24 hr tablet       escitalopram (LEXAPRO) 20 MG tablet Take 20 mg by mouth daily      etanercept (ENBREL) 50 MG/ML injection Inject 1 mL (50 mg) subcutaneously once a week. 4 mL 11    hydroxychloroquine (PLAQUENIL) 200 MG tablet Alternate 1 tablet daily with 2 tablets daily by mouth. . 135 tablet 3    levonorgest-eth estrad 91-Day (SEASONIQUE) 0.15-0.03 &0.01 MG tablet Take 1 tablet by mouth daily      NIFEdipine ER OSMOTIC (PROCARDIA XL) 30 MG 24 hr tablet Take 1 tablet (30 mg) by mouth daily. 90 tablet 1    risperiDONE (RISPERDAL) 0.5 MG tablet 0.5 mg 2 times daily       No current  facility-administered medications for this visit.       Allergies:     Allergies   Allergen Reactions    Seasonal Allergies

## 2025-02-18 NOTE — LETTER
"2/18/2025       RE: Darshana Romero  [address] [facility] [address]     Dear Colleague,    Thank you for referring your patient, Darshana Romero, to the [facility] [place] at [facility]. Please see a copy of my visit note below.    2/18/2025  Neurosurgery Clinic Visit    Chief Complaint: Chiari 1    History of present illness:  Darshana Romero is a 19 year old y/o female presenting with a PMHx of Mixed connective tissue disease (ZACK, RNP, Baez, rheumatoid factor, hypogammaglobulinemia, polyarthritis, Raynaud phenomenon), on long term plaquenil, mildly elevated anticardiolipin IgM, and PTSD, presenting today for evaluation of Chiari 1 noted on MRI after workup for headache by her rheumatologist. Patient's headaches have been most significant in the past year, however on chart review she has had difficulty with significant headaches requiring a doctor's appt since 2013. Today she is accompanied by her father.    Patient reports over the last year she's started noticing pain with upward gaze, and gets short duration occipital headaches triggered by manipulating neck into certain positions (especially looking up). She reports that she feels her eyes \"bouncing\" often and she's not clear what happened. She also reports photophobia.    Her father reports that she's had intermittent finger numbness (not when having a Raynaud's episode), foot and leg numbness, and difficulty with gait for years, worsening over the last few years. She reports significant fatigue and poor quality sleep. She endorses significant snoring. She denies being told she has apneic episodes while sleeping.    Darshana is a student at [employer].     Physical exam:   There were no vitals taken for this visit.  General: Awake and alert and in no acute distress.  Pulm: Breathing comfortably on room air  CN: Symmetric browlift, smile, tongue protrusion, " palate elevation, and sternocleidomastoids. No dysarthria. Extraocular muscles are all intact. Pupils react bilaterally and equally. Right beating nystagmus on left end gaze. Hearing intact to conversation, however patient reports noticing a clear difference between the perceived pitch in either ear.  Coordination: Intact finger-nose-finger bilaterally. Symmetric rapid alternating movements in bilateral upper extremities   Motor: Good muscle bulk throughout. 5 out of 5 strength in bilateral upper and lower extremities   Sensation: Sensation grossly intact to light touch in all extremities.   Gait: Intact tandem gait.   Reflexes: 1+ reflexes bilateral biceps, brachioradialis. 3+ patellar tendons. Valdes's reflex negative. Bilateral clonus negative.     Imaging:  MRI brain and C-spine 2/1/2025:  The cerebellar tonsils and up to 9 mm below the level of the foramen magnum   and demonstrate abnormal peg-shaped morphology, consistent with a Chiari I malformation. Normal position of the obex and brainstem. Normal osseous morphology of the skull base. Nonspecific reversal the normal cervical lordotic curvature centered at C5.             Assessment:  # Chiari 1 malformation  On discussion with staff, low suspicion for symptoms to be secondary to Chiari 1 malformation, however would be reasonable to evaluate with further imaging. Should additionally be provided resources for management of chronic headaches.    Plan:  - MRI CINE, follow up appointment on telehealth  - Referral to headache neurology    Patient seen and discussed with Dr. Shaun MD.  Approximately 45 minutes were spent in chart and image review, evaluation of the patient and assessment of next steps.    Jackie Anthony MD on 2/18/2025 at 12:01 PM  PGY-1, Department of Neurosurgery  [facility]                REVIEWED ASSOCIATED CLINICAL DATA AND HISTORY FOUND IN THE MEDICAL RECORD:  Past Medical History:   Past Medical History:  "  Diagnosis Date     Arthritis      Chronic superficial gastritis without bleeding 10/06/2017    Likely due to NSAID use     Fracture, humerus     left, Summer of 2012     Lymphadenitis     per parent, recurrent, R \"parotid,\" has been treated with antibiotics in past.     MCTD (mixed connective tissue disease) 05/17/2013    +RNP, slightly +Baez, o/w neg DREW, negative dsDNA, normal complements, negative antiphopholipid antibodies (last checked 9/2012). Has Raynaud's Phenomenon and polyarthritis (wrists, fingers).         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  Improved after decreasing methotrexate dose.       Surgical History:   Past Surgical History:   Procedure Laterality Date     NO HISTORY OF SURGERY         Social history:   Social History     Tobacco Use     Smoking status: Never     Passive exposure: Yes     Smokeless tobacco: Never     Tobacco comments:     Mom smokes outside   Substance Use Topics     Alcohol use: No     Drug use: Yes     Types: Marijuana     Comment: occasionally; denies other drug/alcohol use.       Family history:   Family History   Problem Relation Age of Onset     Neurologic Disorder Brother         Autism     Other - See Comments Paternal Grandmother         Grave's disease, renal failure     Lupus Other         Multiple family members on paternal side       Medications:  Current Outpatient Medications   Medication Sig Dispense Refill     buPROPion (WELLBUTRIN XL) 300 MG 24 hr tablet        escitalopram (LEXAPRO) 20 MG tablet Take 20 mg by mouth daily       etanercept (ENBREL) 50 MG/ML injection Inject 1 mL (50 mg) subcutaneously once a week. 4 mL 11     hydroxychloroquine (PLAQUENIL) 200 MG tablet Alternate 1 tablet daily with 2 " tablets daily by mouth. . 135 tablet 3     levonorgest-eth estrad 91-Day (SEASONIQUE) 0.15-0.03 &0.01 MG tablet Take 1 tablet by mouth daily       NIFEdipine ER OSMOTIC (PROCARDIA XL) 30 MG 24 hr tablet Take 1 tablet (30 mg) by mouth daily. 90 tablet 1     risperiDONE (RISPERDAL) 0.5 MG tablet 0.5 mg 2 times daily       No current facility-administered medications for this visit.       Allergies:     Allergies   Allergen Reactions     Seasonal Allergies        Attestation signed by Ezra Dunn MD at 3/4/2025  9:07 AM:  I have seen and examined the patient and agree with the residents assessment and plan.      Again, thank you for allowing me to participate in the care of your patient.      Sincerely,    Ezra Dunn MD

## 2025-03-31 ENCOUNTER — VIRTUAL VISIT (OUTPATIENT)
Dept: NEUROSURGERY | Facility: CLINIC | Age: 20
End: 2025-03-31
Attending: NEUROLOGICAL SURGERY
Payer: COMMERCIAL

## 2025-03-31 DIAGNOSIS — M35.1 MCTD (MIXED CONNECTIVE TISSUE DISEASE): ICD-10-CM

## 2025-03-31 DIAGNOSIS — G93.5 CHIARI I MALFORMATION (H): ICD-10-CM

## 2025-03-31 PROCEDURE — 98012 SYNCH AUDIO-ONLY EST SF 10: CPT | Performed by: NEUROLOGICAL SURGERY

## 2025-03-31 NOTE — PROGRESS NOTES
Darshana is a 19 year old who is being evaluated via a billable phone visit.      I had the pleasure to talk to Pat today by telephone during a neurosurgical phone visit.  She gave consent for this visit.    Briefly she is a 19-year-old woman that was referred to me with an incidental finding of Chiari I malformation.  When I saw her in clinic I did not think that this was symptomatic however she had not yet had a brain MRI CINE study.    She has now had the brain CINE study and returns to clinic today.    I have reviewed the study and it is totally normal.  As such I do not believe that this is a symptomatic Chiari malformation nor would she benefit from any neurosurgical intervention.    She was relieved by these results.  At this point she will follow-up with our headache neurologist.

## 2025-03-31 NOTE — LETTER
3/31/2025       RE: Darshana Romero  [address] [address]     Dear Colleague,    Thank you for referring your patient, Darshana Romero, to the [facility]ERY [facility]. Please see a copy of my visit note below.    Darshana is a 19 year old who is being evaluated via a billable phone visit.      I had the pleasure to talk to Pat today by telephone during a neurosurgical phone visit.  She gave consent for this visit.    Briefly she is a 19-year-old woman that was referred to me with an incidental finding of Chiari I malformation.  When I saw her in clinic I did not think that this was symptomatic however she had not yet had a brain MRI CINE study.    She has now had the brain CINE study and returns to clinic today.    I have reviewed the study and it is totally normal.  As such I do not believe that this is a symptomatic Chiari malformation nor would she benefit from any neurosurgical intervention.    She was relieved by these results.  At this point she will follow-up with our headache neurologist.      Again, thank you for allowing me to participate in the care of your patient.      Sincerely,    Ezra Dunn MD

## 2025-03-31 NOTE — PATIENT INSTRUCTIONS
Imaging completed 2/27/25: MR Cine/CSF Flow study is totally normal.  As such I do not believe that this is a symptomatic Chiari malformation nor would she benefit from any neurosurgical intervention.    Continue to follow up with Neurology Headache Clinic.  Please call if you want a referral to [facility] Headache Clinic.    Call Liliana FLORES  Neurosurgery Care Coordinator with questions/concerns     Thank you for using [facility]